# Patient Record
Sex: FEMALE | Race: WHITE
[De-identification: names, ages, dates, MRNs, and addresses within clinical notes are randomized per-mention and may not be internally consistent; named-entity substitution may affect disease eponyms.]

---

## 2021-11-26 ENCOUNTER — HOSPITAL ENCOUNTER (EMERGENCY)
Dept: HOSPITAL 63 - ER | Age: 26
Discharge: HOME | End: 2021-11-26
Payer: COMMERCIAL

## 2021-11-26 VITALS — BODY MASS INDEX: 27.02 KG/M2 | HEIGHT: 59 IN | WEIGHT: 134.04 LBS

## 2021-11-26 VITALS — DIASTOLIC BLOOD PRESSURE: 61 MMHG | SYSTOLIC BLOOD PRESSURE: 128 MMHG

## 2021-11-26 DIAGNOSIS — B34.9: ICD-10-CM

## 2021-11-26 DIAGNOSIS — U07.1: Primary | ICD-10-CM

## 2021-11-26 DIAGNOSIS — F17.210: ICD-10-CM

## 2021-11-26 PROCEDURE — U0003 INFECTIOUS AGENT DETECTION BY NUCLEIC ACID (DNA OR RNA); SEVERE ACUTE RESPIRATORY SYNDROME CORONAVIRUS 2 (SARS-COV-2) (CORONAVIRUS DISEASE [COVID-19]), AMPLIFIED PROBE TECHNIQUE, MAKING USE OF HIGH THROUGHPUT TECHNOLOGIES AS DESCRIBED BY CMS-2020-01-R: HCPCS

## 2021-11-26 PROCEDURE — 99284 EMERGENCY DEPT VISIT MOD MDM: CPT

## 2021-11-26 PROCEDURE — 71045 X-RAY EXAM CHEST 1 VIEW: CPT

## 2021-11-26 PROCEDURE — C9803 HOPD COVID-19 SPEC COLLECT: HCPCS

## 2021-11-26 NOTE — RAD
EXAMINATION: XR CHEST 1V



CLINICAL HISTORY: Cough



EXAM DATE/TIME: 11/26/2021 7:53 PM



COMPARISON: None





FINDINGS: 



Lines, Tubes, and Devices: None.



Cardiomediastinal Silhouette: Within normal limits.



Lungs and Pleura: No evidence of focal airspace consolidation or pleural effusion. Pulmonary vasculat
ure unremarkable.



Bones and Soft Tissues: No acute osseous abnormality.

 



IMPRESSION:



No evidence of acute cardiopulmonary abnormality.



Electronically signed by: Fabio Up DO (11/26/2021 8:49 PM) [place]

## 2021-11-26 NOTE — PHYS DOC
Past History


Past Medical History:  Depression


Past Surgical History:  Appendectomy


Additional Past Surgical Histo:  liver biopsy


Smoking:  Cigarettes


Alcohol Use:  None


Drug Use:  None





Adult General


HPI


HPI





Patient is a 26-year-old female who presents with nasal congestion, and loss of 

taste for the last 3 days, with a mild dry cough.  States several people at work

and had similar symptoms.  States she took an at home test for Covid today and 

was positive in 1 to get 1 here to confirm.  Also want to work note.  States she

is able to eat and drink.  Denies any recent traumas, travels, fevers, chest 

pain, shortness of breath, abdominal pain, nausea, vomiting, diarrhea, dysuria, 

hematuria, blood in the stool.  Denies any vaginal bleeding, discharge or pain. 

States she is 18 weeks pregnant, has had a normal ultrasound and is still 

feeling baby move.





Review of Systems


Review of Systems


Review of systems otherwise unremarkable except noted in HPI





Allergies


Allergies





Allergies








Coded Allergies Type Severity Reaction Last Updated Verified


 


  No Known Drug Allergies    1/12/19 No











Physical Exam


Physical Exam





Constitutional: Well developed, well nourished, no acute distress, non-toxic 

appearance. []


HENT: Normocephalic, atraumatic,  oropharynx moist, no oral exudates, nose 

normal. []


Eyes:  conjunctiva normal, no discharge. [] 


Neck: Normal range of motion, no tenderness, supple, no stridor. [] 


Cardiovascular:Heart rate regular rhythm, no murmur []


Lungs & Thorax:  Bilateral breath sounds clear to auscultation []


Abdomen: soft, no tenderness, no masses, no pulsatile masses. [] 


Skin: Warm, dry, no erythema, no rash. [] 


Back:  no CVA tenderness. [] 


Extremities: No tenderness, no cyanosis, no clubbing, ROM intact, no edema. [] 


Neurologic: Alert and oriented X 3, normal motor function, normal sensory 

function, no focal deficits noted. []


Psychologic: Affect normal, judgement normal, mood normal. []





EKG


EKG


[]





Radiology/Procedures


Radiology/Procedures


[]





Heart Score


C/O Chest Pain:  No


Risk Factors:


Risk Factors:  DM, Current or recent (<one month) smoker, HTN, HLP, family 

history of CAD, obesity.


Risk Scores:


Risk Factors:  DM, Current or recent (<one month) smoker, HTN, HLP, family 

history of CAD, obesity.





Course & Med Decision Making


Course & Med Decision Making


Patient is a 26-year-old female presents with congestion and loss of taste with 

a positive home Covid test today wanting an official test and a work note


Vital signs not concerning.  Physical exam noted above.  Given Tylenol.  Covid 

swab pending.  Chest x-ray not concerning for pneumonia.


Discussed all findings with patient.  Advised to follow-up as soon as possible 

with her primary care physician and/or OB/GYN to update on ED visit.


Gave education and quarantine's instructions on Covid and made aware we would 

call with results.  Gave return precautions to the ED.


Patient grateful, verbalized understanding and agreed with plan of discharge.


[]





Dragon Disclaimer


Dragon Disclaimer


This electronic medical record was generated, in whole or in part, using a voice

 recognition dictation system.





Departure


Departure:


Impression:  


   Primary Impression:  


   Viral syndrome


   Additional Impression:  


   Person under investigation for COVID-19


Disposition:  01 HOME / SELF CARE / HOMELESS


Condition:  GOOD


Referrals:  


RAQUEL BRONSON MD (PCP)


Patient Instructions:  Viral Syndrome





Additional Instructions:  





Thanks for coming into the emergency department tonight and allowing us to take 

care of you.  Please read the attached information carefully to go back over 

some of the things we discussed.  Please continue Tylenol, and Benadryl as we d

iscussed and as needed.  Please update your primary care physician and your 

OB/GYN of your ED visit.  Please come back with new or concerning symptoms as we

 discussed.





You have been tested for or diagnosed with COVID-19. It is an infection caused 

by a new type 





of coronavirus. COVID-19 will cause cold-like or mild flu symptoms in most. It 

can cause 


more severe symptoms like problems breathing in some.





There is no treatment for COVID-19. The body will clear the infection over time.

 Self-care 


will help to ease discomfort.





Steps to Take:


Self-Care


Rest as needed. Healthy habits may help you feel better. Steps include:





Choose healthy foods including fruits and vegetables. Drink water throughout the

 day.


Get plenty of sleep each night.


If you smoke, try to quit. It may ease breathing.


Avoid alcohol.


Keep Others Healthy


The virus can spread to others. Droplets are released every time you sneeze or 

cough. The 


droplets can get into the mouth, nose, or eyes of people near you and lead to 

infection. To 


lower the chances of spreading COVID-19 to others:





Stay at home until your doctor has said it is safe to leave. If you tested 

positive this 


will mean staying isolated until both of the following are true:





At least 7 days have passed since the start of illness.


You are free of fever for at least 72 hours without the use of medicine.


During this time:





 - Avoid public areas, events, or transportation. Do not return to work or 

school until your 





doctor has said it is safe to do so.


 - Call ahead if you need to go to a medical center. Let them know you may have 

COVID-19. It 





will help them guide you where to go. They may also ask you to wear a facemask 

when you come 





to the office.


 - If you call for emergency medical services, let them know you may have 

COVID-19.


While at home:





 - Try to avoid close contact with others. Stay about 6 feet away.


 - If possible, spend most of your time in a separate room from others.


 - Use a face mask if you will be in close contact with others such as sharing a

 room or 


vehicle.


 - Have someone wipe down common surfaces in the home. Use household  

every day on 


areas like doorknobs, counters, or sinks.


 - Cough or sneeze into a tissue. Throw the tissue away right after use. If a 

tissue is not 


available, cough or sneeze into your elbow.


 - Wash your hands often. Wash them after sneezing or coughing. Use soap and 

water and wash 


for at least 20 seconds. Alcohol based hand  can be used if soap and 

water is not 


available.


 - Do not prepare food for others. Avoid sharing personal items like forks, 

spoons, or 


toothbrushes.


 - Avoid close contact with pets while you are sick. There is no evidence of the

 virus 


passing to pets. This is a safety step until more is known about this virus.


Isolation can be frustrating. Social interaction can help. Keep in touch with 

friends and 


family through phone and tech options. You can still interact with others in 

your home, just 





keep a safe distance of about 6 feet.





Follow-up:


Your doctors office will check in with you to see if there are any changes in 

your health. 


You may be asked to keep track of symptoms to share with them. They will also 

let you know 


when you are clear to be in public again.





Problems to Look Out For:


Contact your doctor if your recovery is not going as you expect. Get emergency 

care if you 


have problems such as:





 - Trouble breathing


 - Nonstop chest pain or pressure


 - Changes in awareness, confusion, or problems waking


 - Lips or face have bluish color


 - Worsening of symptoms


If you think you have an emergency, call for emergency medical services right 

away.





[facility]





Problem Qualifiers











JYOTHI BRISENO MD               Nov 26, 2021 19:05

## 2022-05-31 ENCOUNTER — HOSPITAL ENCOUNTER (EMERGENCY)
Dept: HOSPITAL 63 - ER | Age: 27
Discharge: HOME | End: 2022-05-31
Payer: COMMERCIAL

## 2022-05-31 VITALS — DIASTOLIC BLOOD PRESSURE: 83 MMHG | SYSTOLIC BLOOD PRESSURE: 130 MMHG

## 2022-05-31 VITALS — WEIGHT: 102.51 LBS | BODY MASS INDEX: 20.67 KG/M2 | HEIGHT: 59 IN

## 2022-05-31 DIAGNOSIS — R51.9: ICD-10-CM

## 2022-05-31 DIAGNOSIS — Z20.822: ICD-10-CM

## 2022-05-31 DIAGNOSIS — F17.210: ICD-10-CM

## 2022-05-31 DIAGNOSIS — J02.9: Primary | ICD-10-CM

## 2022-05-31 LAB
INFLUENZA A PATIENT: NEGATIVE
INFLUENZA B PATIENT: NEGATIVE

## 2022-05-31 PROCEDURE — 99283 EMERGENCY DEPT VISIT LOW MDM: CPT

## 2022-05-31 PROCEDURE — 87070 CULTURE OTHR SPECIMN AEROBIC: CPT

## 2022-05-31 PROCEDURE — 87880 STREP A ASSAY W/OPTIC: CPT

## 2022-05-31 PROCEDURE — 87428 SARSCOV & INF VIR A&B AG IA: CPT

## 2022-05-31 NOTE — PHYS DOC
Past History


Past Medical History:  Depression


 (ROLO NEAL)


Past Surgical History:  Appendectomy


Additional Past Surgical Histo:  liver biopsy


 (ROLO NEAL)


Smoking:  Cigarettes


Alcohol Use:  None


Drug Use:  None


 (ROLO NEAL)





General Adult


EDM:


Chief Complaint:  SORE THROAT





HPI:


HPI:


Patient is a 26-year-old female who presents to the emergency department with a 

3-day history of sore throat, headache, nausea and a productive cough with clear

mucus.  Patient denies sick exposures, vomiting, fevers.  She has no medical 

history.  Vaccines are up-to-date.


 (ROLO NEAL)





Review of Systems:


Review of Systems:


Constitutional: See HPI


HENT: See HPI


Respiratory: See HPI


GI: See HPI


Neurologic: See HPI


 (ROLO NEAL)





Allergies:


Allergies:





Allergies








Coded Allergies Type Severity Reaction Last Updated Verified


 


  No Known Drug Allergies    5/31/22 No








 (ROLO NEAL)





Physical Exam:


PE:





Constitutional: Well developed, well nourished, no acute distress, non-toxic 

appearance. []


HENT: Normocephalic, atraumatic, bilateral external ears normal, oropharynx 

moist, 2+ tonsillar enlargement with erythema, no tonsillar exudate, uvula 

midline, no trismus or phonation changes, no oral exudates, nose normal. []


Eyes: PERRL, EOMI, conjunctiva normal, no discharge. [] 


Neck: Normal range of motion, left tonsillar lymphadenopathy supple, no stridor.

 [] 


Cardiovascular:Heart rate regular rhythm, no murmur []


Lungs & Thorax:  Bilateral breath sounds clear to auscultation []


Abdomen: Bowel sounds normal, soft, no tenderness, no masses, no pulsatile 

masses. [] 


Skin: Warm, dry, no erythema, no rash. [] 


Back: No tenderness, normal range of motion


Extremities: No tenderness, no cyanosis, no clubbing, ROM intact, no edema. [] 


Neurologic: Alert and oriented X 3, normal motor function, normal sensory 

function, no focal deficits noted. []


Psychologic: Affect normal, judgement normal, mood normal. []


 (ROLO NEAL)





Current Patient Data:


Vital Signs:





                                   Vital Signs








  Date Time  Temp Pulse Resp B/P (MAP) Pulse Ox O2 Delivery O2 Flow Rate FiO2


 


5/31/22 18:02 98.2 61 20 132/60 (84) 100 Room Air  








 (ROLO NEAL)





EKG:


EKG:


[]


 (ROLO NEAL)





Radiology/Procedures:


Radiology/Procedures:


[]


 (ROLO NEAL)





Heart Score:


C/O Chest Pain:  N/A


Risk Factors:


Risk Factors:  DM, Current or recent (<one month) smoker, HTN, HLP, family 

history of CAD, obesity.


Risk Scores:


Score 0 - 3:  2.5% MACE over next 6 weeks - Discharge Home


Score 4 - 6:  20.3% MACE over next 6 weeks - Admit for Clinical Observation


Score 7 - 10:  72.7% MACE over next 6 weeks - Early Invasive Strategies


 (ROLO NEAL)





Course & Med Decision Making:


Course & Med Decision Making


Pertinent Labs and Imaging studies reviewed. (See chart for details)





[] Patient presents to the emergency department for sore throat, headache, 

productive cough with nausea that started 3 days ago.  Patient will be tested 

for COVID, influenza and strep throat.  Strep, COVID and influenza test were 

negative.  Patient's Centor score is 2.  She is being discharged home with 

steroid Dosepak.  She is advised to take anti-inflammatory medications, perform 

warm salt water gargles.  I discussed with patient all findings and diagnostic 

testing as well as the need to follow-up with PCP for further evaluation and 

treatment or return to the ER if any new or worsening symptoms.  Strict return 

precautions were also discussed at length.  Patient voiced understanding and 

agreement with the plan.  Patient is hemodynamically stable at the time of 

disposition.


 (ROLO NEAL)


Course & Med Decision Making


Did not see or evaluate patient.  Did not discuss patient with NP.  Generally 

agree with NP's work-up and disposition per note


 (JYOTHI BRISENO MD)


Dragon Disclaimer:


Dragon Disclaimer:


This electronic medical record was generated, in whole or in part, using a voice

 recognition dictation system.


 (ROLO NEAL)





Departure


Departure:


Impression:  


   Primary Impression:  


   Pharyngitis


   Qualified Codes:  J02.9 - Acute pharyngitis, unspecified


Disposition:  01 HOME / SELF CARE / HOMELESS


Condition:  GOOD


Referrals:  


PCP,NO (PCP)


Patient Instructions:  Viral Pharyngitis





Additional Instructions:  


You were seen in the emergency department today for sore throat had negative 

strep, COVID and influenza testing.  You are being discharged home with a 

steroid Dosepak which will help with the inflammation in your throat.  Take 

Tylenol and ibuprofen at home for pain or fevers.  You can perform warm salt 

water gargles.  You can take over-the-counter cough medication as needed.  

Follow-up with your primary care provider within 2 days for reevaluation.  

Return to the emergency department if you develop shortness of breath, chest 

pain, difficulty swallowing or maintaining your secretions, high fevers 

refractory to treatment, intractable nausea or vomiting.


Scripts


Methylprednisolone (MEDROL) 4 Mg Tab.ds.pk


1 PKG PO UD for inflammation, #1 PKG 0 Refills


   Prov: ROLO NEAL         5/31/22











ROLO NEAL          May 31, 2022 18:23


JYOTHI BRISENO MD               May 31, 2022 19:49